# Patient Record
Sex: FEMALE | Race: ASIAN | NOT HISPANIC OR LATINO | URBAN - METROPOLITAN AREA
[De-identification: names, ages, dates, MRNs, and addresses within clinical notes are randomized per-mention and may not be internally consistent; named-entity substitution may affect disease eponyms.]

---

## 2021-06-17 ENCOUNTER — EMERGENCY (EMERGENCY)
Facility: HOSPITAL | Age: 26
LOS: 1 days | Discharge: HOME | End: 2021-06-17
Admitting: EMERGENCY MEDICINE
Payer: COMMERCIAL

## 2021-06-17 VITALS
SYSTOLIC BLOOD PRESSURE: 113 MMHG | DIASTOLIC BLOOD PRESSURE: 76 MMHG | TEMPERATURE: 99 F | OXYGEN SATURATION: 100 % | RESPIRATION RATE: 18 BRPM | HEART RATE: 94 BPM

## 2021-06-17 LAB — HETEROPH AB TITR SER AGGL: NEGATIVE — SIGNIFICANT CHANGE UP

## 2021-06-17 RX ORDER — IBUPROFEN 200 MG
600 TABLET ORAL ONCE
Refills: 0 | Status: COMPLETED | OUTPATIENT
Start: 2021-06-17 | End: 2021-06-17

## 2021-06-17 RX ADMIN — Medication 600 MILLIGRAM(S): at 13:58

## 2021-06-17 NOTE — ED PROVIDER NOTE - PATIENT PORTAL LINK FT
You can access the FollowMyHealth Patient Portal offered by [facility] by registering at the following website: [email or web address]. By joining [employer]’s FollowMyHealth portal, you will also be able to view your health information using other applications (apps) compatible with our system.

## 2021-06-17 NOTE — ED PROVIDER NOTE - NSFOLLOWUPINSTRUCTIONS_ED_ALL_ED_FT
Take Tylenol 650mg every 6 hours as needed for pain or fever  Alternate with ibuprofen 600mg every 6 hours as needed     Follow up with your primary care physician in 48-72 hours- bring copies of your results.      Return to the ER for worsening or persistent symptoms, and/or ANY NEW OR CONCERNING SYMPTOMS including difficulty swallowing, persistent fever.     If you have issues obtaining follow up, please call: [phone] to obtain a doctor or specialist who takes your insurance in your area.  You may call [phone] to make an appointment with the internal medicine clinic.

## 2021-06-17 NOTE — ED PROVIDER NOTE - OBJECTIVE STATEMENT
26 year old female who is actively being treated for lyme presents with 1d hx of scratchy throat, leg aching and left neck lymph tenderness. Reports feeling fatigued a couple of months ago and after several lab tests was diagnosed with lyme disease and began treatment with doxy 100 BID 5/27. She reports intermittent nausea and diarrhea with the abx treatment, but otherwise well until yesterday evening when she noted her legs to be achy, her throat to feel "scratchy... like allergies or something" and left neck lymph node tenderness, so she checked her temperature and received result of 100.4. She went to urgent care last night and tested negative for covid rapid test. She presents today with concern for her lymph node. Reports that she shares drinks with family and friends. Received her second moderna vaccine in February. LMP 5/25. Denies pain with swallowing, chills, cough, nasal discharge, chest pain, shortness of breath, difficulty breathing, calf tenderness, any prolonged sitting.

## 2021-06-17 NOTE — ED PROVIDER NOTE - PROGRESS NOTE DETAILS
Supervising Statement (FELIPA Rowe): I have personally seen and examined this patient.  I have fully participated in the care of this patient. I have reviewed all pertinent clinical information, including history, physical exam, plan and the ACP Fellow's note and agree except as noted.

## 2021-06-17 NOTE — ED PROVIDER NOTE - CLINICAL SUMMARY MEDICAL DECISION MAKING FREE TEXT BOX
26 year old female who is actively being treated for lyme presents with 1d hx of scratchy throat, leg aching and left neck lymph tenderness. On exam, no tonsillar swelling, no redness to redd-pharynx. Likely viral illness, will test for mono, treat with ibuprofen, and recommend follow-up with PCP.

## 2021-06-17 NOTE — ED ADULT NURSE NOTE - OBJECTIVE STATEMENT
Pt received c/o low grade fever, sore throat and swollen lymph nodes. history of Lyme disease, currently taking doxycycline. Denies CP, deneies SOB, respirations even and unlabored. No apparent distress noted. Continue to monitor.

## 2021-06-17 NOTE — ED ADULT TRIAGE NOTE - CHIEF COMPLAINT QUOTE
Pt c/o "scratchy throat" and low grade fever since yesterday. Pt states lrt side of her neck her lymph nodes are swollen  Pt has lyme disease and is on doxycyline

## 2021-06-21 ENCOUNTER — EMERGENCY (EMERGENCY)
Facility: HOSPITAL | Age: 26
LOS: 1 days | Discharge: HOME | End: 2021-06-21
Attending: EMERGENCY MEDICINE | Admitting: EMERGENCY MEDICINE
Payer: COMMERCIAL

## 2021-06-21 VITALS
SYSTOLIC BLOOD PRESSURE: 119 MMHG | TEMPERATURE: 101 F | HEART RATE: 110 BPM | RESPIRATION RATE: 18 BRPM | OXYGEN SATURATION: 99 % | DIASTOLIC BLOOD PRESSURE: 65 MMHG

## 2021-06-21 NOTE — ED ADULT TRIAGE NOTE - CHIEF COMPLAINT QUOTE
Pt complaining of fever x6 days along with L inflamed tonsil. Pt recently here at [facility] thursday and saw PCP today, tested negative for mono, strep, and covid. Tmax 103.4 Pt diagnosed with chronic lyme 3 weeks ago and has been taking PO doxy twice a day.

## 2021-06-22 VITALS
SYSTOLIC BLOOD PRESSURE: 100 MMHG | HEART RATE: 68 BPM | DIASTOLIC BLOOD PRESSURE: 68 MMHG | TEMPERATURE: 99 F | RESPIRATION RATE: 16 BRPM | OXYGEN SATURATION: 100 %

## 2021-06-22 LAB
ALBUMIN SERPL ELPH-MCNC: 4.3 G/DL — SIGNIFICANT CHANGE UP (ref 3.3–5)
ALP SERPL-CCNC: 77 U/L — SIGNIFICANT CHANGE UP (ref 40–120)
ALT FLD-CCNC: 10 U/L — SIGNIFICANT CHANGE UP (ref 4–33)
ANION GAP SERPL CALC-SCNC: 15 MMOL/L — HIGH (ref 7–14)
APPEARANCE UR: CLEAR — SIGNIFICANT CHANGE UP
AST SERPL-CCNC: 17 U/L — SIGNIFICANT CHANGE UP (ref 4–32)
BASOPHILS # BLD AUTO: 0 K/UL — SIGNIFICANT CHANGE UP (ref 0–0.2)
BASOPHILS NFR BLD AUTO: 0 % — SIGNIFICANT CHANGE UP (ref 0–2)
BILIRUB SERPL-MCNC: <0.2 MG/DL — SIGNIFICANT CHANGE UP (ref 0.2–1.2)
BILIRUB UR-MCNC: NEGATIVE — SIGNIFICANT CHANGE UP
BLOOD GAS VENOUS COMPREHENSIVE RESULT: SIGNIFICANT CHANGE UP
BLOOD GAS VENOUS COMPREHENSIVE RESULT: SIGNIFICANT CHANGE UP
BUN SERPL-MCNC: 7 MG/DL — SIGNIFICANT CHANGE UP (ref 7–23)
CALCIUM SERPL-MCNC: 9.5 MG/DL — SIGNIFICANT CHANGE UP (ref 8.4–10.5)
CHLORIDE SERPL-SCNC: 99 MMOL/L — SIGNIFICANT CHANGE UP (ref 98–107)
CO2 SERPL-SCNC: 18 MMOL/L — LOW (ref 22–31)
COLOR SPEC: YELLOW — SIGNIFICANT CHANGE UP
CREAT SERPL-MCNC: 0.63 MG/DL — SIGNIFICANT CHANGE UP (ref 0.5–1.3)
DIFF PNL FLD: ABNORMAL
EOSINOPHIL # BLD AUTO: 0.05 K/UL — SIGNIFICANT CHANGE UP (ref 0–0.5)
EOSINOPHIL NFR BLD AUTO: 0.9 % — SIGNIFICANT CHANGE UP (ref 0–6)
GLUCOSE SERPL-MCNC: 150 MG/DL — HIGH (ref 70–99)
GLUCOSE UR QL: ABNORMAL
HCT VFR BLD CALC: 36 % — SIGNIFICANT CHANGE UP (ref 34.5–45)
HGB BLD-MCNC: 12.2 G/DL — SIGNIFICANT CHANGE UP (ref 11.5–15.5)
IANC: 4.36 K/UL — SIGNIFICANT CHANGE UP (ref 1.5–8.5)
KETONES UR-MCNC: NEGATIVE — SIGNIFICANT CHANGE UP
LEUKOCYTE ESTERASE UR-ACNC: NEGATIVE — SIGNIFICANT CHANGE UP
LYMPHOCYTES # BLD AUTO: 0.35 K/UL — LOW (ref 1–3.3)
LYMPHOCYTES # BLD AUTO: 6.6 % — LOW (ref 13–44)
MCHC RBC-ENTMCNC: 29.1 PG — SIGNIFICANT CHANGE UP (ref 27–34)
MCHC RBC-ENTMCNC: 33.9 GM/DL — SIGNIFICANT CHANGE UP (ref 32–36)
MCV RBC AUTO: 85.9 FL — SIGNIFICANT CHANGE UP (ref 80–100)
MONOCYTES # BLD AUTO: 0.2 K/UL — SIGNIFICANT CHANGE UP (ref 0–0.9)
MONOCYTES NFR BLD AUTO: 3.8 % — SIGNIFICANT CHANGE UP (ref 2–14)
NEUTROPHILS # BLD AUTO: 4.51 K/UL — SIGNIFICANT CHANGE UP (ref 1.8–7.4)
NEUTROPHILS NFR BLD AUTO: 81.3 % — HIGH (ref 43–77)
NITRITE UR-MCNC: NEGATIVE — SIGNIFICANT CHANGE UP
PH UR: 6 — SIGNIFICANT CHANGE UP (ref 5–8)
PLATELET # BLD AUTO: 217 K/UL — SIGNIFICANT CHANGE UP (ref 150–400)
POTASSIUM SERPL-MCNC: 3.5 MMOL/L — SIGNIFICANT CHANGE UP (ref 3.5–5.3)
POTASSIUM SERPL-SCNC: 3.5 MMOL/L — SIGNIFICANT CHANGE UP (ref 3.5–5.3)
PROT SERPL-MCNC: 7.9 G/DL — SIGNIFICANT CHANGE UP (ref 6–8.3)
PROT UR-MCNC: ABNORMAL
RBC # BLD: 4.19 M/UL — SIGNIFICANT CHANGE UP (ref 3.8–5.2)
RBC # FLD: 11.8 % — SIGNIFICANT CHANGE UP (ref 10.3–14.5)
SODIUM SERPL-SCNC: 132 MMOL/L — LOW (ref 135–145)
SP GR SPEC: 1.03 — HIGH (ref 1.01–1.02)
UROBILINOGEN FLD QL: SIGNIFICANT CHANGE UP
WBC # BLD: 5.3 K/UL — SIGNIFICANT CHANGE UP (ref 3.8–10.5)
WBC # FLD AUTO: 5.3 K/UL — SIGNIFICANT CHANGE UP (ref 3.8–10.5)

## 2021-06-22 RX ORDER — LIDOCAINE 4 G/100G
10 CREAM TOPICAL ONCE
Refills: 0 | Status: COMPLETED | OUTPATIENT
Start: 2021-06-22 | End: 2021-06-22

## 2021-06-22 RX ORDER — SODIUM CHLORIDE 9 MG/ML
2000 INJECTION INTRAMUSCULAR; INTRAVENOUS; SUBCUTANEOUS ONCE
Refills: 0 | Status: COMPLETED | OUTPATIENT
Start: 2021-06-22 | End: 2021-06-22

## 2021-06-22 RX ORDER — KETOROLAC TROMETHAMINE 30 MG/ML
15 SYRINGE (ML) INJECTION ONCE
Refills: 0 | Status: DISCONTINUED | OUTPATIENT
Start: 2021-06-22 | End: 2021-06-22

## 2021-06-22 RX ORDER — PENICILLIN G BENZATHINE 1200000 [IU]/2ML
1.2 INJECTION, SUSPENSION INTRAMUSCULAR ONCE
Refills: 0 | Status: COMPLETED | OUTPATIENT
Start: 2021-06-22 | End: 2021-06-22

## 2021-06-22 RX ORDER — ACETAMINOPHEN 500 MG
650 TABLET ORAL ONCE
Refills: 0 | Status: COMPLETED | OUTPATIENT
Start: 2021-06-22 | End: 2021-06-22

## 2021-06-22 RX ADMIN — Medication 650 MILLIGRAM(S): at 02:40

## 2021-06-22 RX ADMIN — SODIUM CHLORIDE 2000 MILLILITER(S): 9 INJECTION INTRAMUSCULAR; INTRAVENOUS; SUBCUTANEOUS at 02:39

## 2021-06-22 RX ADMIN — Medication 650 MILLIGRAM(S): at 04:26

## 2021-06-22 RX ADMIN — Medication 15 MILLIGRAM(S): at 04:26

## 2021-06-22 RX ADMIN — Medication 15 MILLIGRAM(S): at 02:40

## 2021-06-22 RX ADMIN — PENICILLIN G BENZATHINE 1.2 MILLION UNIT(S): 1200000 INJECTION, SUSPENSION INTRAMUSCULAR at 04:48

## 2021-06-22 RX ADMIN — SODIUM CHLORIDE 2000 MILLILITER(S): 9 INJECTION INTRAMUSCULAR; INTRAVENOUS; SUBCUTANEOUS at 04:26

## 2021-06-22 RX ADMIN — LIDOCAINE 10 MILLILITER(S): 4 CREAM TOPICAL at 02:40

## 2021-06-22 NOTE — ED PROVIDER NOTE - CLINICAL SUMMARY MEDICAL DECISION MAKING FREE TEXT BOX
27 y/o F with 6 days of fever, sore throat.  No airway compromise, tolerating secretions.  Pt febrile and tachycardic meets sepsis criteria, will obtain labs, ua, cxr.  Likely viral pharyngitis, no e/o PTA, centor criteria 4 will give IM PCN for mod risk of strep pharyngitis.

## 2021-06-22 NOTE — ED PROVIDER NOTE - PATIENT PORTAL LINK FT
You can access the FollowMyHealth Patient Portal offered by [facility] by registering at the following website: [email or web address]. By joining [employer]’s FollowMyHealth portal, you will also be able to view your health information using other applications (apps) compatible with our system.

## 2021-06-22 NOTE — ED ADULT NURSE NOTE - CHIEF COMPLAINT QUOTE
Pt complaining of fever x6 days along with L inflamed tonsil. Pt recently here at [facility] thursday and saw PCP today, tested negative for mono, strep, and covid. Tmax 103.4 Pt diagnosed with chronic lyme 3 weeks ago and has been taking PO doxy twice a day.

## 2021-06-22 NOTE — ED PROVIDER NOTE - NSFOLLOWUPINSTRUCTIONS_ED_ALL_ED_FT
-You were treated with penicillin.  -Continue taking your medications as prescribed.   -Take ibuprofen/acetaminophen for pain/fever as needed.  Take ibuprofen 600 mg (three 200 mg over the counter pills) every 8 hours as needed for moderate pain--take with food. Do not take this medication if you have a bleeding disorder, stomach or GI ulcer problems or liver disease. Take 2 regular strength acetaminophen (650mg) or 1 extra strength (500mg) of acetaminophen every 8 hours. If you have any questions please consult a pharmacist or your PMD.     Tonsillitis       Tonsillitis is an infection of the throat that causes the tonsils to become red, tender, and swollen. Tonsils are tissues in the back of your throat. Each tonsil has crevices (crypts). Tonsils normally work to protect the body from infection.      What are the causes?    Sudden (acute) tonsillitis may be caused by a virus or bacteria, including streptococcal bacteria. Long-lasting (chronic) tonsillitis occurs when the crypts of the tonsils become filled with pieces of food and bacteria, which makes it easy for the tonsils to become repeatedly infected.    Tonsillitis can be spread from person to person (is contagious). It may be spread by inhaling droplets that are released with coughing or sneezing. You may also come into contact with viruses or bacteria on surfaces, such as cups or utensils.      What are the signs or symptoms?  Symptoms of this condition include:  •A sore throat. This may include trouble swallowing.      •White patches on the tonsils.      •Swollen tonsils.      •Fever.      •Headache.      •Tiredness.      •Loss of appetite.      •Snoring during sleep when you did not snore before.      •Small, foul-smelling, yellowish-white pieces of material (tonsilloliths) that you occasionally cough up or spit out. These can cause you to have bad breath.        How is this diagnosed?    This condition is diagnosed with a physical exam. Diagnosis can be confirmed with the results of lab tests, including a throat culture.      How is this treated?  Treatment for this condition depends on the cause, but usually focuses on treating the symptoms associated with it. Treatment may include:  •Medicines to relieve pain and manage fever.      •Steroid medicines to reduce swelling.      •Antibiotic medicines if the condition is caused by bacteria.      If attacks of tonsillitis are severe and frequent, your health care provider may recommend surgery to remove the tonsils (tonsillectomy).      Follow these instructions at home:    Medicines     •Take over-the-counter and prescription medicines only as told by your health care provider.      •If you were prescribed an antibiotic medicine, take it as told by your health care provider. Do not stop taking the antibiotic even if you start to feel better.      Eating and drinking     •Drink enough fluid to keep your urine clear or pale yellow.      •While your throat is sore, eat soft or liquid foods, such as sherbet, soups, or instant breakfast drinks.      •Drink warm liquids.      •Eat frozen ice pops.      General instructions     •Rest as much as possible and get plenty of sleep.      •Gargle with a salt-water mixture 3–4 times a day or as needed. To make a salt-water mixture, completely dissolve ½-1 tsp of salt in 1 cup of warm water.      •Wash your hands regularly with soap and water. If soap and water are not available, use hand .      • Do not share cups, bottles, or other utensils until your symptoms have gone away.      • Do not smoke. This can help your symptoms and prevent the infection from coming back. If you need help quitting, ask your health care provider.      •Keep all follow-up visits as told by your health care provider. This is important.        Contact a health care provider if:    •You notice large, tender lumps in your neck that were not there before.      •You have a fever that does not go away after 2–3 days.      •You develop a rash.      •You cough up a green, yellow-brown, or bloody substance.      •You cannot swallow liquids or food for 24 hours.      •Only one of your tonsils is swollen.        Get help right away if:    •You develop any new symptoms, such as vomiting, severe headache, stiff neck, chest pain, trouble breathing, or trouble swallowing.      •You have severe throat pain along with drooling or voice changes.      •You have severe pain that is not controlled with medicines.      •You cannot fully open your mouth.      •You develop redness, swelling, or severe pain anywhere in your neck.        Summary    •Tonsillitis is an infection of the throat that causes the tonsils to become red, tender, and swollen.      •Tonsillitis may be caused by a virus or bacteria.      •Rest as much as possible. Get plenty of sleep.

## 2021-06-22 NOTE — ED PROVIDER NOTE - OBJECTIVE STATEMENT
25 y/o F currently being treated for chronic lyme on doxycycline here with fever.  Pt reports 6 days of fever associated with sore throat.  Pt seen at urgent care last Wednesday and had a negative covid test at the time.  She cont to have fever and was seen in the ED, had a neg mono test.  Yesterday she was seen again at urgent care and rx medrol dose pack, which she did not start until tonight.  She is occasionally taking ibuprofen for fever, but none today.

## 2021-06-22 NOTE — ED ADULT NURSE NOTE - OBJECTIVE STATEMENT
pt a&o x3 currently being treated for chronic lyme with doxycycline c/o fever tmax 103.4 x 6 days. fever associated with sore throat.  was seen at urgent care last Wednesday and had a negative covid test at the time.  also neg mono test.  taking motrin for fever with some relief. md assessed. nad noted. right ac 20g placed.

## 2021-06-23 LAB
CULTURE RESULTS: NO GROWTH — SIGNIFICANT CHANGE UP
SPECIMEN SOURCE: SIGNIFICANT CHANGE UP

## 2021-06-27 LAB
CULTURE RESULTS: SIGNIFICANT CHANGE UP
CULTURE RESULTS: SIGNIFICANT CHANGE UP
SPECIMEN SOURCE: SIGNIFICANT CHANGE UP
SPECIMEN SOURCE: SIGNIFICANT CHANGE UP

## 2021-07-31 ENCOUNTER — EMERGENCY (EMERGENCY)
Facility: HOSPITAL | Age: 26
LOS: 1 days | Discharge: HOME | End: 2021-07-31
Attending: EMERGENCY MEDICINE | Admitting: EMERGENCY MEDICINE
Payer: COMMERCIAL

## 2021-07-31 VITALS
RESPIRATION RATE: 16 BRPM | TEMPERATURE: 98 F | OXYGEN SATURATION: 100 % | DIASTOLIC BLOOD PRESSURE: 81 MMHG | SYSTOLIC BLOOD PRESSURE: 126 MMHG | HEART RATE: 79 BPM

## 2021-07-31 LAB
ALBUMIN SERPL ELPH-MCNC: 4.6 G/DL — SIGNIFICANT CHANGE UP (ref 3.3–5)
ALP SERPL-CCNC: 78 U/L — SIGNIFICANT CHANGE UP (ref 40–120)
ALT FLD-CCNC: 15 U/L — SIGNIFICANT CHANGE UP (ref 4–33)
ANION GAP SERPL CALC-SCNC: 12 MMOL/L — SIGNIFICANT CHANGE UP (ref 7–14)
APPEARANCE UR: ABNORMAL
AST SERPL-CCNC: 43 U/L — HIGH (ref 4–32)
BACTERIA # UR AUTO: NEGATIVE — SIGNIFICANT CHANGE UP
BASOPHILS # BLD AUTO: 0.04 K/UL — SIGNIFICANT CHANGE UP (ref 0–0.2)
BASOPHILS NFR BLD AUTO: 0.6 % — SIGNIFICANT CHANGE UP (ref 0–2)
BILIRUB SERPL-MCNC: 0.4 MG/DL — SIGNIFICANT CHANGE UP (ref 0.2–1.2)
BILIRUB UR-MCNC: NEGATIVE — SIGNIFICANT CHANGE UP
BUN SERPL-MCNC: 10 MG/DL — SIGNIFICANT CHANGE UP (ref 7–23)
CALCIUM SERPL-MCNC: 9.5 MG/DL — SIGNIFICANT CHANGE UP (ref 8.4–10.5)
CHLORIDE SERPL-SCNC: 98 MMOL/L — SIGNIFICANT CHANGE UP (ref 98–107)
CO2 SERPL-SCNC: 22 MMOL/L — SIGNIFICANT CHANGE UP (ref 22–31)
COLOR SPEC: COLORLESS — SIGNIFICANT CHANGE UP
CREAT SERPL-MCNC: 0.58 MG/DL — SIGNIFICANT CHANGE UP (ref 0.5–1.3)
DIFF PNL FLD: NEGATIVE — SIGNIFICANT CHANGE UP
EOSINOPHIL # BLD AUTO: 0.2 K/UL — SIGNIFICANT CHANGE UP (ref 0–0.5)
EOSINOPHIL NFR BLD AUTO: 3 % — SIGNIFICANT CHANGE UP (ref 0–6)
EPI CELLS # UR: SIGNIFICANT CHANGE UP /HPF (ref 0–5)
GLUCOSE SERPL-MCNC: 102 MG/DL — HIGH (ref 70–99)
GLUCOSE UR QL: NEGATIVE — SIGNIFICANT CHANGE UP
HCT VFR BLD CALC: 38 % — SIGNIFICANT CHANGE UP (ref 34.5–45)
HGB BLD-MCNC: 12.7 G/DL — SIGNIFICANT CHANGE UP (ref 11.5–15.5)
IANC: 3.79 K/UL — SIGNIFICANT CHANGE UP (ref 1.5–8.5)
IMM GRANULOCYTES NFR BLD AUTO: 0.1 % — SIGNIFICANT CHANGE UP (ref 0–1.5)
KETONES UR-MCNC: NEGATIVE — SIGNIFICANT CHANGE UP
LEUKOCYTE ESTERASE UR-ACNC: NEGATIVE — SIGNIFICANT CHANGE UP
LYMPHOCYTES # BLD AUTO: 2.26 K/UL — SIGNIFICANT CHANGE UP (ref 1–3.3)
LYMPHOCYTES # BLD AUTO: 33.6 % — SIGNIFICANT CHANGE UP (ref 13–44)
MCHC RBC-ENTMCNC: 29.3 PG — SIGNIFICANT CHANGE UP (ref 27–34)
MCHC RBC-ENTMCNC: 33.4 GM/DL — SIGNIFICANT CHANGE UP (ref 32–36)
MCV RBC AUTO: 87.8 FL — SIGNIFICANT CHANGE UP (ref 80–100)
MONOCYTES # BLD AUTO: 0.42 K/UL — SIGNIFICANT CHANGE UP (ref 0–0.9)
MONOCYTES NFR BLD AUTO: 6.3 % — SIGNIFICANT CHANGE UP (ref 2–14)
NEUTROPHILS # BLD AUTO: 3.79 K/UL — SIGNIFICANT CHANGE UP (ref 1.8–7.4)
NEUTROPHILS NFR BLD AUTO: 56.4 % — SIGNIFICANT CHANGE UP (ref 43–77)
NITRITE UR-MCNC: NEGATIVE — SIGNIFICANT CHANGE UP
NRBC # BLD: 0 /100 WBCS — SIGNIFICANT CHANGE UP
NRBC # FLD: 0 K/UL — SIGNIFICANT CHANGE UP
PH UR: 7 — SIGNIFICANT CHANGE UP (ref 5–8)
PLATELET # BLD AUTO: 326 K/UL — SIGNIFICANT CHANGE UP (ref 150–400)
POTASSIUM SERPL-MCNC: 5.4 MMOL/L — HIGH (ref 3.5–5.3)
POTASSIUM SERPL-SCNC: 5.4 MMOL/L — HIGH (ref 3.5–5.3)
PROT SERPL-MCNC: 9.1 G/DL — HIGH (ref 6–8.3)
PROT UR-MCNC: ABNORMAL
RBC # BLD: 4.33 M/UL — SIGNIFICANT CHANGE UP (ref 3.8–5.2)
RBC # FLD: 11.9 % — SIGNIFICANT CHANGE UP (ref 10.3–14.5)
RBC CASTS # UR COMP ASSIST: <5 /HPF — HIGH (ref 0–4)
SODIUM SERPL-SCNC: 132 MMOL/L — LOW (ref 135–145)
SP GR SPEC: 1.02 — SIGNIFICANT CHANGE UP (ref 1.01–1.02)
UROBILINOGEN FLD QL: SIGNIFICANT CHANGE UP
WBC # BLD: 6.72 K/UL — SIGNIFICANT CHANGE UP (ref 3.8–10.5)
WBC # FLD AUTO: 6.72 K/UL — SIGNIFICANT CHANGE UP (ref 3.8–10.5)
WBC UR QL: <5 /HPF — SIGNIFICANT CHANGE UP (ref 0–5)

## 2021-07-31 NOTE — ED ADULT NURSE NOTE - OBJECTIVE STATEMENT
Pt received AOx4, ambulatory at baseline presents to the ED s/p syncopal episode yesterday while at work. Pt states she was at work, felt lightheaded and SOB prior to syncopal episode and remembers waking up on the floor. Sure she hit her head. Endorses tenderness on the right front head area and left back side of the head. Denies CP, SOB, NVD, abd pain, chills, fever, cough,  symptoms. 20G placed in RAC. Labs and UA sent.

## 2021-07-31 NOTE — ED PROVIDER NOTE - OBJECTIVE STATEMENT
This is a 26 yr old F, pmh lyme disease with c/o syncopic episode yesterday while at work. Pt states she felt she will pass out. Reports 3 episode of near syncope. C/o head tenderness and left ankle pain. ( no visible signs of injury) Today she woke up with slight light- headadness resolved. Denies, fever chills, vision changes, n/v, urinary symptoms, This is a 26 yr old F, pmh lyme disease with c/o syncopic episode lasted about 1 min, yesterday while at work. Pt states she felt she will pass out. Reports 3 episode of near syncope. C/o head tenderness and left ankle pain. ( no visible signs of injury) Today she woke up with slight light- headadness resolved. LMP 7/16/21. Denies, fever chills, vision changes, n/v, urinary symptoms,

## 2021-07-31 NOTE — ED PROVIDER NOTE - PROGRESS NOTE DETAILS
NP Berecmatthewky- labs- wnl, xray normal  denies any loc, n/v/ light headadness dizziness during er stay. Encouraged out patient cardiology follow up.

## 2021-07-31 NOTE — ED PROVIDER NOTE - CLINICAL SUMMARY MEDICAL DECISION MAKING FREE TEXT BOX
This is a 26 yr old F, pmh lyme disease with c/o syncopic episode lasted about 1 min, yesterday while at work. Pt states she felt she will pass out. Reports 3 episode of near syncope. C/o head tenderness and left ankle pain. ( no visible signs of injury) Today she woke up with slight light- headadness resolved. LMP 7/16/21. Denies, fever chills, vision changes, n/v, urinary symptoms,  Labs, xray left ankle  ekg- wnl This is a 26 yr old F, pmh lyme disease with c/o syncopic episode lasted about 1 min, yesterday while at work. Pt states she felt she will pass out. Reports 3 episode of near syncope. C/o head tenderness and left ankle pain. ( no visible signs of injury) Today she woke up with slight light- headadness resolved. LMP 7/16/21. Denies, fever chills, vision changes, n/v, urinary symptoms,  Labs, xray left ankle  ekg- wnl  There is no clinical evidence any acute medical problem requiring immediate intervention. This is a 26 yr old F, pmh lyme disease with c/o syncopic episode lasted about 1 min, yesterday while at work. Pt states she felt she will pass out. Reports 3 episode of near syncope. C/o head tenderness and left ankle pain. ( no visible signs of injury) Today she woke up with slight light- headadness resolved. LMP 7/16/21. Denies, fever chills, vision changes, n/v, urinary symptoms,  Labs, xray left ankle  ekg- wnl  There is no clinical evidence any acute medical problem requiring immediate intervention.    antonio: pt presents after syncope yesterday.  denies feeling unwell, n/v,diarrhea.  she bruised her head and has a tender left ankle, although no bruising or other areas of specific tenderenss.  denies cp, sob.  ecg non ischemic, no signs of dehydration, labs are reasonable, no pregnancy, no severe anemia.  pt with other episodes previously.  would have her f/u with cardiology.  here pt awake, alert, pleasant, soft abd, no r/g/t.  sclera anicteric

## 2021-07-31 NOTE — ED PROVIDER NOTE - ATTENDING CONTRIBUTION TO CARE
antonio: pt presents after syncope yesterday.  denies feeling unwell, n/v,diarrhea.  she bruised her head and has a tender left ankle, although no bruising or other areas of specific tenderenss.  denies cp, sob.  ecg non ischemic, no signs of dehydration, labs are reasonable, no pregnancy, no severe anemia.  pt with other episodes previously.  would have her f/u with cardiology.  here pt awake, alert, pleasant, soft abd, no r/g/t.  sclera anicteric    I performed a history and physical exam of the patient and discussed their management with the resident and /or advanced care provider. I reviewed the resident and /or ACP's note and agree with the documented findings and plan of care. My medical decison making and observations are found above.

## 2021-07-31 NOTE — ED PROVIDER NOTE - PATIENT PORTAL LINK FT
You can access the FollowMyHealth Patient Portal offered by [facility] by registering at the following website: [email or web address]. By joining [employer]’s FollowMyHealth portal, you will also be able to view your health information using other applications (apps) compatible with our system.

## 2021-07-31 NOTE — ED ADULT TRIAGE NOTE - CHIEF COMPLAINT QUOTE
pt c.o unwitnessed syncopal episode yesterday while at work. 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 Currently denies dizziness, CP, SOB. Endorsing left ankle pain. No blood thinner use or significant PMH